# Patient Record
Sex: MALE | Race: WHITE | Employment: FULL TIME | URBAN - METROPOLITAN AREA
[De-identification: names, ages, dates, MRNs, and addresses within clinical notes are randomized per-mention and may not be internally consistent; named-entity substitution may affect disease eponyms.]

---

## 2019-11-30 ENCOUNTER — HOSPITAL ENCOUNTER (EMERGENCY)
Facility: HOSPITAL | Age: 29
Discharge: HOME | End: 2019-11-30
Attending: EMERGENCY MEDICINE
Payer: COMMERCIAL

## 2019-11-30 VITALS
RESPIRATION RATE: 16 BRPM | BODY MASS INDEX: 30.8 KG/M2 | DIASTOLIC BLOOD PRESSURE: 63 MMHG | OXYGEN SATURATION: 96 % | HEART RATE: 66 BPM | WEIGHT: 240 LBS | TEMPERATURE: 98 F | HEIGHT: 74 IN | SYSTOLIC BLOOD PRESSURE: 127 MMHG

## 2019-11-30 DIAGNOSIS — R19.7 DIARRHEA OF PRESUMED INFECTIOUS ORIGIN: Primary | ICD-10-CM

## 2019-11-30 PROCEDURE — 87427 SHIGA-LIKE TOXIN AG IA: CPT | Performed by: EMERGENCY MEDICINE

## 2019-11-30 PROCEDURE — 99284 EMERGENCY DEPT VISIT MOD MDM: CPT

## 2019-11-30 PROCEDURE — 87046 STOOL CULTR AEROBIC BACT EA: CPT | Performed by: EMERGENCY MEDICINE

## 2019-11-30 PROCEDURE — 80053 COMPREHEN METABOLIC PANEL: CPT | Performed by: EMERGENCY MEDICINE

## 2019-11-30 PROCEDURE — 87045 FECES CULTURE AEROBIC BACT: CPT | Performed by: EMERGENCY MEDICINE

## 2019-11-30 PROCEDURE — 96361 HYDRATE IV INFUSION ADD-ON: CPT

## 2019-11-30 PROCEDURE — 85025 COMPLETE CBC W/AUTO DIFF WBC: CPT | Performed by: EMERGENCY MEDICINE

## 2019-11-30 PROCEDURE — 82272 OCCULT BLD FECES 1-3 TESTS: CPT | Performed by: EMERGENCY MEDICINE

## 2019-11-30 PROCEDURE — 87493 C DIFF AMPLIFIED PROBE: CPT | Performed by: EMERGENCY MEDICINE

## 2019-11-30 PROCEDURE — 96374 THER/PROPH/DIAG INJ IV PUSH: CPT

## 2019-11-30 RX ORDER — KETOROLAC TROMETHAMINE 30 MG/ML
15 INJECTION, SOLUTION INTRAMUSCULAR; INTRAVENOUS ONCE
Status: COMPLETED | OUTPATIENT
Start: 2019-11-30 | End: 2019-11-30

## 2019-11-30 RX ORDER — ERGOCALCIFEROL 1.25 MG/1
50000 CAPSULE ORAL
COMMUNITY

## 2019-11-30 NOTE — ED PROVIDER NOTES
Patient Seen in: [facility] Emergency Department      History   Patient presents with:  Abdomen/Flank Pain (GI/)    Stated Complaint: lower abd pain    HPI    Patient is a 67-year-old male, history of appendectomy done remotely, here for evaluatio Pulmonary/Chest: Effort normal and breath sounds normal. No stridor. Abdominal: Soft. There is no tenderness. There is no guarding. Musculoskeletal: Exhibits no edema or tenderness.      Neurological: Pt is alert and oriented to person, place, and t STOOL CULTURE(P)[[id number]]                                 In process                 SHIGATOXIN[[id number]]                                       In process                   Please view results for these tests on the individual orders.    STOOL CULT

## 2019-11-30 NOTE — ED INITIAL ASSESSMENT (HPI)
Pt here c/o mid lower abdomen pain and cramping, started 20 hours prior to arrival, having diarrhea.

## 2023-11-14 ENCOUNTER — TREATMENT (OUTPATIENT)
Dept: PHYSICAL THERAPY | Facility: CLINIC | Age: 33
End: 2023-11-14
Payer: COMMERCIAL

## 2023-11-14 DIAGNOSIS — M54.41 CHRONIC BILATERAL LOW BACK PAIN WITH BILATERAL SCIATICA: Primary | ICD-10-CM

## 2023-11-14 DIAGNOSIS — M25.512 LEFT SHOULDER PAIN: ICD-10-CM

## 2023-11-14 DIAGNOSIS — G89.29 CHRONIC BILATERAL LOW BACK PAIN WITH BILATERAL SCIATICA: Primary | ICD-10-CM

## 2023-11-14 DIAGNOSIS — M54.42 CHRONIC BILATERAL LOW BACK PAIN WITH BILATERAL SCIATICA: Primary | ICD-10-CM

## 2023-11-14 PROCEDURE — 97014 ELECTRIC STIMULATION THERAPY: CPT | Mod: GP

## 2023-11-14 PROCEDURE — 97110 THERAPEUTIC EXERCISES: CPT | Mod: GP

## 2023-11-14 PROCEDURE — 97140 MANUAL THERAPY 1/> REGIONS: CPT | Mod: GP

## 2023-11-14 NOTE — PROGRESS NOTES
Physical Therapy Treatment    Patient Name: Magdy Xie  MRN: [id number]  Today's Date: 11/15/2023  Visit 27 of 30  Time Calculation  Start Time: 1437  Stop Time: 1520  Time Calculation (min): 43 min  PT Modalities Time Entry  E-Stim (Unattended) Time Entry: 12  PT Therapeutic Procedures Time Entry  Manual Therapy Time Entry: 12  Therapeutic Exercise Time Entry: 17    Current Problem:   1. Chronic bilateral low back pain with bilateral sciatica        2. Left shoulder pain          Precautions:    N/A    Subjective   General:    Patient reports he's been traveling a lot more lately, resulting in increased low back and radicular symptoms, as well as increased L cervical and anterior shoulder pain. L shoulder pain is aggravated with weight lifting.  Pre-Treatment Pain:     Low back and L anterior shoulder sore    Objective   Findings:   L anterior shoulder pain (AC joint, bicipital groove) with resisted flex/abd at 90, + O'briens test  Treatments:   Therapeutic Exercise:   Assessment and HEP review / update for L shoulder stab  CKC/OKC shoulder stab (body blade multiplane, BOSU plank weight shifts, Plank rotations, Tband IR/ER pulses at 0 and 90.  D1/D2 flex and ext AROM/tband     Manual Therapy:   L AC joint mobs, GH joint mobs, CT rotational and prone thoracic HVLAT    Modalities:   Dry needling following informed consent: with e-stim; 5Hz, mA to tolerance, applied to L shoulder, cervical, for 12 minutes.     Assessment:    Displayed good L shoulder PROM in all planes, however painful flex and abd MMT, as well as pain with biceps MMT, and + O'briens tests signifying potential SLAP and/or AC involvement. Joint mobs prior to OKC/CKC shoulder stab exercises with fair patient tolerance, add to HEP. Concluded with DN+stim to anterior shoulder and L cervical/UT for myofascial restrictions and pain modulation.  Post-Treatment Pain:  Sore L shoulder  Plan:    Continue 1-2x/month as needed for low back / shoulder stab  progression and pain control.     Goals:   Active       PT Problem       PT Goal 1       Start:  11/15/23    Expected End:  01/31/24       1) Pain levels no greater than 3/10 at worst with activity.  2) Normalize bilateral LE neurodynamics with minimal pain radiation or parasthesias in LE.  3) LBD questionnaire score less than 12% impairment.  4) Full and pain-free lumbar and bilateral hip AROM in all planes.  5) Return to weight lifting and gym exercise with minimal functional limitations.

## 2023-11-21 ENCOUNTER — TREATMENT (OUTPATIENT)
Dept: PHYSICAL THERAPY | Facility: CLINIC | Age: 33
End: 2023-11-21
Payer: COMMERCIAL

## 2023-11-21 DIAGNOSIS — M54.42 CHRONIC BILATERAL LOW BACK PAIN WITH BILATERAL SCIATICA: ICD-10-CM

## 2023-11-21 DIAGNOSIS — M54.41 CHRONIC BILATERAL LOW BACK PAIN WITH BILATERAL SCIATICA: ICD-10-CM

## 2023-11-21 DIAGNOSIS — G89.29 CHRONIC BILATERAL LOW BACK PAIN WITH BILATERAL SCIATICA: ICD-10-CM

## 2023-11-21 DIAGNOSIS — M25.512 ACUTE PAIN OF LEFT SHOULDER: Primary | ICD-10-CM

## 2023-11-21 PROCEDURE — 97110 THERAPEUTIC EXERCISES: CPT | Mod: GP | Performed by: PHYSICAL THERAPIST

## 2023-11-21 PROCEDURE — 97014 ELECTRIC STIMULATION THERAPY: CPT | Mod: GP | Performed by: PHYSICAL THERAPIST

## 2023-11-21 NOTE — PROGRESS NOTES
Physical Therapy Treatment    Patient Name: Magdy Xie  MRN: [id number]  Today's Date: 11/24/2023  Visit 28 of 30  Time Calculation  Start Time: 0340  Stop Time: 0422  Time Calculation (min): 42 min  PT Modalities Time Entry  E-Stim (Unattended) Time Entry: 10  PT Therapeutic Procedures Time Entry  Therapeutic Exercise Time Entry: 30    Current Problem:   1. Acute pain of left shoulder        2. Chronic bilateral low back pain with bilateral sciatica  Follow Up In Physical Therapy        Precautions:    N/A    Subjective   General:   Pt reports that he hasn't had much change in shoulder pain since last session .   Pre-Treatment Pain:     Low back and L anterior shoulder sore    Objective   Findings:   + empty cane, TTP over SS, bi tendon not as much. Effesui in subacromial space.  L anterior shoulder pain (AC joint, bicipital groove) with resisted flex/abd at 90, + O'briens test  Treatments:   Therapeutic Exercise:   UBE 30/30 L 3.5 x 5 min   Y 3#,T5# and I5# 10 x 2  Tband IR/ER purple 10 x 3  Scaption 3#, 5# 10 x   Assessment and HEP review / update for L shoulder stab  CKC/OKC shoulder stab (body blade multiplane, BOSU plank weight shifts, Plank rotations, Tband IR/ER pulses at 0 and 90.  D1/D2 flex and ext AROM/tband     Manual Therapy:   L AC joint mobs, GH joint mobs,   DNP: CT rotational and prone thoracic HVLAT    Modalities:   Dry needling following informed consent: with e-stim; 5Hz, mA to tolerance, applied to L shoulder, cervical, for 12 minutes.     Assessment:    Challenged by ER and scaption.   Post-Treatment Pain:  Sore L shoulder  Plan:    Continue 1-2x/month as needed for low back / shoulder stab progression and pain control.     Goals:   Active       PT Problem       PT Goal 1       Start:  11/15/23    Expected End:  01/31/24       1) Pain levels no greater than 3/10 at worst with activity.  2) Normalize bilateral LE neurodynamics with minimal pain radiation or parasthesias in LE.  3) LBD  questionnaire score less than 12% impairment.  4) Full and pain-free lumbar and bilateral hip AROM in all planes.  5) Return to weight lifting and gym exercise with minimal functional limitations.

## 2023-11-28 ENCOUNTER — TREATMENT (OUTPATIENT)
Dept: PHYSICAL THERAPY | Facility: CLINIC | Age: 33
End: 2023-11-28
Payer: COMMERCIAL

## 2023-11-28 DIAGNOSIS — M54.41 CHRONIC BILATERAL LOW BACK PAIN WITH BILATERAL SCIATICA: ICD-10-CM

## 2023-11-28 DIAGNOSIS — M25.512 ACUTE PAIN OF LEFT SHOULDER: Primary | ICD-10-CM

## 2023-11-28 DIAGNOSIS — G89.29 CHRONIC BILATERAL LOW BACK PAIN WITH BILATERAL SCIATICA: ICD-10-CM

## 2023-11-28 DIAGNOSIS — M54.42 CHRONIC BILATERAL LOW BACK PAIN WITH BILATERAL SCIATICA: ICD-10-CM

## 2023-11-28 PROCEDURE — 97012 MECHANICAL TRACTION THERAPY: CPT | Mod: GP

## 2023-11-28 PROCEDURE — 97110 THERAPEUTIC EXERCISES: CPT | Mod: GP

## 2023-11-28 PROCEDURE — 97014 ELECTRIC STIMULATION THERAPY: CPT | Mod: GP

## 2023-11-28 PROCEDURE — 97140 MANUAL THERAPY 1/> REGIONS: CPT | Mod: GP

## 2023-11-28 ASSESSMENT — PAIN SCALES - GENERAL: PAINLEVEL_OUTOF10: 2

## 2023-11-28 ASSESSMENT — PAIN - FUNCTIONAL ASSESSMENT: PAIN_FUNCTIONAL_ASSESSMENT: 0-10

## 2023-11-28 NOTE — PROGRESS NOTES
Physical Therapy Treatment    Patient Name: Magdy Xie  MRN: [id number]  PT Received On: 11/28/23  Visit 29 of 30  Time Calculation  Start Time: 1530  Stop Time: 1635  Time Calculation (min): 65 min  PT Modalities Time Entry  E-Stim (Unattended) Time Entry: 12  Mechanical Traction Time Entry: 12  PT Therapeutic Procedures Time Entry  Manual Therapy Time Entry: 15  Therapeutic Exercise Time Entry: 15     Current Problem:   1. Acute pain of left shoulder        2. Chronic bilateral low back pain with bilateral sciatica  Follow Up In Physical Therapy      Precautions:    N/A    Subjective   General:   Pt reports his neck and upper back has felt very stiff and achy lately. He's been doing HEP and has less UT and L pec pain in general, however he still feels anterior shoulder pain with OH or weight bearing activity. He also notices increased L shoulder pain when his neck is aggravated.   Pre-Treatment Pain:   Pain Assessment: 0-10  Pain Score: 2  Response to Interventions: Sore    Objective   Findings:   Cervical ROM = WFL, however L cervical and UT pulling and pain with flexion, R/L rotation.  (Previous findings below)  + empty can, TTP over SS, bi tendon not as much. Effusion in subacromial space.  L anterior shoulder pain (bicipital groove) with resisted flex/abd at 90, + O'briens test  Treatments:   Therapeutic Exercise:   Supine DCF training (chin nods, slight activation and lift from pillow)  Seated cervical retraction / nods  Seated cerv retraction with L rotation (to point of symptoms)  Quadruped cerv retraction, without and with R/L rotations    Manual Therapy:   Seated CT, prone rotational, and prone thoracic HVLAT. Supine cerv SG mobs and manual traction. L 1st rib mobs. Manual scalene stretch R/L.    Modalities:   Dry needling following informed consent: with e-stim; 5Hz, mA to tolerance, applied to Tony CT paraspinals, UT, for 12 minutes. Prone    Mechanical traction: Intermittent: 22-24 lbs and 40 seconds  on, 10 lbs and 10 seconds off, for 12 minutes in Supine, with wedge Cervical    Assessment:    Signs and symptoms of cervicothoracic disc dysfunction. Issued cervical repeated motion and DCF strengthening activities as part of clinic and home exercise program. Modalities for pain modulation, spinal/disc decompression, and to reduce CT paraspinal muscle tone, tolerated well.    Plan:    Continue 1-2x/month as needed for low back/neck and shoulder stab progression and pain control.     Goals:   Active       PT Problem       PT Goal 1       Start:  11/15/23    Expected End:  01/31/24       1) Pain levels no greater than 3/10 at worst with activity.  2) Normalize bilateral LE neurodynamics with minimal pain radiation or parasthesias in LE.  3) LBD questionnaire score less than 12% impairment.  4) Full and pain-free lumbar and bilateral hip AROM in all planes.  5) Return to weight lifting and gym exercise with minimal functional limitations.

## 2024-01-09 ENCOUNTER — TREATMENT (OUTPATIENT)
Dept: PHYSICAL THERAPY | Facility: CLINIC | Age: 34
End: 2024-01-09
Payer: COMMERCIAL

## 2024-01-09 DIAGNOSIS — M54.41 CHRONIC BILATERAL LOW BACK PAIN WITH BILATERAL SCIATICA: ICD-10-CM

## 2024-01-09 DIAGNOSIS — M54.42 CHRONIC BILATERAL LOW BACK PAIN WITH BILATERAL SCIATICA: ICD-10-CM

## 2024-01-09 DIAGNOSIS — G89.29 CHRONIC BILATERAL LOW BACK PAIN WITH BILATERAL SCIATICA: ICD-10-CM

## 2024-01-09 PROCEDURE — 97110 THERAPEUTIC EXERCISES: CPT | Mod: GP | Performed by: PHYSICAL THERAPIST

## 2024-01-09 NOTE — PROGRESS NOTES
Physical Therapy Treatment    Patient Name: Magdy Xie  MRN: [id number]   1/9/24  Visit 30 of 34  Time Calculation  Start Time: 0415  Stop Time: 0500  Time Calculation (min): 45 min  PT Therapeutic Procedures Time Entry  Manual Therapy Time Entry: 30     Current Problem:   1. Chronic bilateral low back pain with bilateral sciatica  Follow Up In Physical Therapy      Precautions:    N/A    Subjective   General:   Pt reports his neck and upper back has felt very stiff and achy lately. He's been doing HEP and has less UT and L pec pain in general, however he still feels anterior shoulder pain with OH or weight bearing activity. He also notices increased L shoulder pain when his neck is aggravated.   Pre-Treatment Pain:        Objective   Findings:   Cervical ROM = WFL, however L cervical and UT pulling and pain with flexion, R/L rotation.  (Previous findings below)  + empty can, TTP over SS, bi tendon not as much. Effusion in subacromial space.  L anterior shoulder pain (bicipital groove) with resisted flex/abd at 90, + O'briens test  Treatments:   Therapeutic Exercise: Reviewed  Supine DCF training (chin nods, slight activation and lift from pillow)  Seated cervical retraction / nods  Seated cerv retraction with L rotation (to point of symptoms)  Quadruped cerv retraction, without and with R/L rotations    Manual Therapy:   Seated CT, prone rotational, and prone thoracic HVLAT. Supine cerv SG mobs and manual traction. L 1st rib mobs. Manual scalene stretch R/L.    Modalities:   Dry needling following informed consent: with e-stim; 5Hz, mA to tolerance, applied to Tony CT paraspinals, UT, for 12 minutes. Prone    Mechanical traction: Intermittent: 22-24 lbs and 40 seconds on, 10 lbs and 10 seconds off, for 12 minutes in Supine, with wedge Cervical    Assessment:    Pt has stopped over head lifting and any other workouts that might bother his shoulder. He has been compliant with new HEP for his neck. He demonstrates  good posture alignment, good GH jt motion, good scapular thoracic movement and neck ROM. Pain is shoulder has improved, but now has localized to L side of T spine 5-7. Work posture seems to leading casue of disfunction, given resting shoudler and good posture/mobility and strength, and requires attention to correct.     Plan:    Continue 1-2x/month as needed for low back/neck and shoulder stab progression and pain control.     Goals:   Active       PT Problem       PT Goal 1       Start:  11/15/23    Expected End:  01/31/24       1) Pain levels no greater than 3/10 at worst with activity.  2) Normalize bilateral LE neurodynamics with minimal pain radiation or parasthesias in LE.  3) LBD questionnaire score less than 12% impairment.  4) Full and pain-free lumbar and bilateral hip AROM in all planes.  5) Return to weight lifting and gym exercise with minimal functional limitations.

## 2024-01-23 ENCOUNTER — APPOINTMENT (OUTPATIENT)
Dept: PHYSICAL THERAPY | Facility: CLINIC | Age: 34
End: 2024-01-23
Payer: COMMERCIAL

## 2024-02-06 ENCOUNTER — APPOINTMENT (OUTPATIENT)
Dept: PHYSICAL THERAPY | Facility: CLINIC | Age: 34
End: 2024-02-06
Payer: COMMERCIAL

## 2024-05-09 ENCOUNTER — DOCUMENTATION (OUTPATIENT)
Dept: PHYSICAL THERAPY | Facility: CLINIC | Age: 34
End: 2024-05-09
Payer: COMMERCIAL

## 2024-05-09 NOTE — PROGRESS NOTES
Physical Therapy    Discharge Summary    Name: Magdy Xie  MRN: [id number]  : 1990  Date: 24    Discharge Summary: PT    Discharge Information: Date of discharge 24, Date of last visit 24, Date of evaluation 22, Number of attended visits 30, and Referred for Low back and shoulder pain    Therapy Summary: Final visit on 24, cancelled remaining 2 appointments. Did not return.    Discharge Status: Case resolved.

## (undated) NOTE — ED AVS SNAPSHOT
Mr. Yonathan Lewis   MRN: [id number]    Department:  [facility] Emergency Department   Date of Visit:  11/30/2019           Disclosure     Insurance plans vary and the physician(s) referred by the ER may not be covered by your plan.  Please contac tell this physician (or your personal doctor if your instructions are to return to your personal doctor) about any new or lasting problems. The primary care or specialist physician will see patients referred from the [facility] Emergency Department.  Vinny Bernstein